# Patient Record
(demographics unavailable — no encounter records)

---

## 2024-10-08 NOTE — ASSESSMENT
[FreeTextEntry1] : 37-year-old female with Moderate obstructive sleep apnea (SANAM) Discussion:  The patient's results from the sleep study were discussed in detail, confirming moderate SANAM. Based on the findings, I recommended CPAP therapy, and the patient agreed to initiate treatment. Plan:  CPAP therapy has been ordered. We will follow up in a couple of months to review the CPAP download data and assess treatment efficacy.

## 2024-10-08 NOTE — PHYSICAL EXAM
[General Appearance - Well Developed] : well developed [General Appearance - Well Nourished] : well nourished [Enlarged Base of the Tongue] : enlargement of the base of the tongue [IV] : IV [Heart Sounds] : normal S1 and S2 [Murmurs] : no murmurs [] : no respiratory distress [Auscultation Breath Sounds / Voice Sounds] : lungs were clear to auscultation bilaterally [No Focal Deficits] : no focal deficits [Oriented To Time, Place, And Person] : oriented to person, place, and time [Memory Recent] : recent memory was not impaired

## 2024-10-08 NOTE — HISTORY OF PRESENT ILLNESS
[Date: ___] : Date of most recent diagnostic polysomnogram: [unfilled] [AHI: ___ per hour] : Apnea-hypopnea index:  [unfilled] per hour [T90%: ___] : T90%: [unfilled]% [[place] desatuation%: ___] : Jairo desaturation:  [unfilled]% [FreeTextEntry1] : 37 year old woman with history of htn, ana lilia is here in the sleep center to address sleep apnea.  Patient is sleepy with [place] sleepiness score of 12.  Patient has very loud snoring which disturbs other people, also has witnessed apneas.  Patient's bedtime is around 10 PM wakes up in the morning around 5.30 AM.   She feels tired when she wakes up.  Patient drinks 1 cup of coffee during the daytime. Patient does not have any headaches or nocturia. She is not sleepy while driving. STOPBANG score - 4 neck size - 15.5 inches  sleep study showed moderate sleep apnea, discussed results and she agrees to go on cpap therapy.

## 2024-10-08 NOTE — HISTORY OF PRESENT ILLNESS
[Date: ___] : Date of most recent diagnostic polysomnogram: [unfilled] [AHI: ___ per hour] : Apnea-hypopnea index:  [unfilled] per hour [T90%: ___] : T90%: [unfilled]% [Jairo desatuation%: ___] : Jairo desaturation:  [unfilled]% [FreeTextEntry1] : 37 year old woman with history of htn, ana lilia is here in the sleep center to address sleep apnea.  Patient is sleepy with [place] sleepiness score of 12.  Patient has very loud snoring which disturbs other people, also has witnessed apneas.  Patient's bedtime is around 10 PM wakes up in the morning around 5.30 AM.   She feels tired when she wakes up.  Patient drinks 1 cup of coffee during the daytime. Patient does not have any headaches or nocturia. She is not sleepy while driving. STOPBANG score - 4 neck size - 15.5 inches  sleep study showed moderate sleep apnea, discussed results and she agrees to go on cpap therapy.